# Patient Record
Sex: MALE | Race: WHITE
[De-identification: names, ages, dates, MRNs, and addresses within clinical notes are randomized per-mention and may not be internally consistent; named-entity substitution may affect disease eponyms.]

---

## 2017-03-20 ENCOUNTER — HOSPITAL ENCOUNTER (OUTPATIENT)
Dept: HOSPITAL 47 - RADMRIMAIN | Age: 52
End: 2017-03-20
Payer: COMMERCIAL

## 2017-03-20 DIAGNOSIS — J32.4: Primary | ICD-10-CM

## 2017-03-20 DIAGNOSIS — H49.11: ICD-10-CM

## 2017-03-20 PROCEDURE — 70553 MRI BRAIN STEM W/O & W/DYE: CPT

## 2017-03-20 NOTE — MR
EXAMINATION TYPE: MR brain wo/w con

 

DATE OF EXAM: 3/20/2017 7:00 AM

 

COMPARISON: NONE

 

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

 

TECHNIQUE:  Multiplanar, multisequence images of the brain and brainstem were acquired before and aft
er administration of  20 mL IV MultiHance.  Diffusion weighted imaging is performed. 

 

FINDINGS:  

No evidence for acute infarction, hemorrhage, mass, mass effect, midline shift, herniation, effacemen
t of basal cisterns, or extra-axial fluid collection. 

 

The ventricles and sulci are age-appropriate.

 

Major intracranial flow voids are intact.

 

T2/FLAIR weighted sequences show mild scattered burden of T2 bright white matter change located in th
e subcortical, periventricular, and deep white matter of both cerebral hemispheres numbering approxim
ately 10 foci on each side.

 

Midline structures demonstrate normal morphology.  The craniocervical junction is normal. 

 

Post contrast images demonstrate no evidence of pathologic enhancement.  Dural venous sinuses are pat
ent.

 

There is mild-to-moderate mucosal thickening throughout the maxillary and frontal sinuses as well as 
the ethmoid air cells.

 

 

IMPRESSION:

 

1. No acute intracranial abnormality seen. No base of skull mass or abnormal intracranial enhancing l
esions.

2. Mild scattered burden of T2 bright white matter change. This is nonspecific but most likely relate
s to chronic small vessel ischemic disease. Clinically correlate.

3. Mild-to-moderate chronic paranasal sinus disease.

## 2018-12-21 ENCOUNTER — HOSPITAL ENCOUNTER (OUTPATIENT)
Dept: HOSPITAL 47 - RADMRIMAIN | Age: 53
Discharge: HOME | End: 2018-12-21
Payer: COMMERCIAL

## 2018-12-21 DIAGNOSIS — R90.89: ICD-10-CM

## 2018-12-21 DIAGNOSIS — G31.1: Primary | ICD-10-CM

## 2018-12-21 DIAGNOSIS — H49.11: ICD-10-CM

## 2018-12-21 PROCEDURE — 70553 MRI BRAIN STEM W/O & W/DYE: CPT

## 2018-12-21 NOTE — MR
EXAMINATION TYPE: MR brain wo/w con

 

DATE OF EXAM: 12/21/2018

 

COMPARISON: MRI brain March 20, 2017

 

HISTORY: Fourth [trochlear] nerve palsy, right eye double vision

 

TECHNIQUE: 

Multiplanar, multisequence images of the brain and brainstem is performed without and with IV contras
t, utilizing 9 mL intravenous Gadavist .

 

FINDINGS: Diffusion weighted images demonstrate no evidence of a recent infarct or other diffusion ab
normality.  There is no worrisome extra-axial fluid collection. There is mild ventricular and sulcal 
prominence consistent with mild diffuse age-related cerebral atrophy. There is redemonstration of sca
ttered foci of T2 hyperintensity seen throughout the white matter bilaterally. Approximately 10-20 sc
attered small lesions are redemonstrated. No significant change from prior MRI is identified.

 

 Midline structures demonstrate normal morphology.  The craniocervical junction appears within normal
 limits.  Post contrast images demonstrate no abnormal enhancement. Mild-to-moderate mucosal thickeni
ng involving bilateral maxillary sinuses is redemonstrated. The globes are intact bilaterally.

 

IMPRESSION: 

Mild diffuse cerebral atrophy and nonspecific white matter changes most likely on basis of chronic sm
all vessel ischemic change in patient of this age. Persistent mild to moderate chronic maxillary sinu
s disease. No significant change from prior study. No suspicious enhancement is noted.

## 2022-07-26 ENCOUNTER — HOSPITAL ENCOUNTER (OUTPATIENT)
Dept: HOSPITAL 47 - LABWHC1 | Age: 57
Discharge: HOME | End: 2022-07-26
Attending: GENERAL PRACTICE
Payer: COMMERCIAL

## 2022-07-26 DIAGNOSIS — E34.9: ICD-10-CM

## 2022-07-26 DIAGNOSIS — B25.9: ICD-10-CM

## 2022-07-26 DIAGNOSIS — B00.9: Primary | ICD-10-CM

## 2022-07-26 DIAGNOSIS — B27.90: ICD-10-CM

## 2022-07-26 DIAGNOSIS — E03.9: ICD-10-CM

## 2022-07-26 LAB
EBV EA IGG SER-ACNC: 1.6 AI
EBV NA IGG SER-ACNC: 3.9 AI
EBV VCA IGG SER IA-ACNC: >8 AI
EBV VCA IGM SP2 SER QL: <0.2 AI
T4 FREE SERPL-MCNC: 1.14 NG/DL (ref 0.8–1.8)

## 2022-07-26 PROCEDURE — 86663 EPSTEIN-BARR ANTIBODY: CPT

## 2022-07-26 PROCEDURE — 84443 ASSAY THYROID STIM HORMONE: CPT

## 2022-07-26 PROCEDURE — 84481 FREE ASSAY (FT-3): CPT

## 2022-07-26 PROCEDURE — 86645 CMV ANTIBODY IGM: CPT

## 2022-07-26 PROCEDURE — 84140 ASSAY OF PREGNENOLONE: CPT

## 2022-07-26 PROCEDURE — 86644 CMV ANTIBODY: CPT

## 2022-07-26 PROCEDURE — 84402 ASSAY OF FREE TESTOSTERONE: CPT

## 2022-07-26 PROCEDURE — 82670 ASSAY OF TOTAL ESTRADIOL: CPT

## 2022-07-26 PROCEDURE — 84403 ASSAY OF TOTAL TESTOSTERONE: CPT

## 2022-07-26 PROCEDURE — 82627 DEHYDROEPIANDROSTERONE: CPT

## 2022-07-26 PROCEDURE — 86665 EPSTEIN-BARR CAPSID VCA: CPT

## 2022-07-26 PROCEDURE — 84439 ASSAY OF FREE THYROXINE: CPT

## 2022-07-26 PROCEDURE — 86664 EPSTEIN-BARR NUCLEAR ANTIGEN: CPT

## 2022-07-26 PROCEDURE — 36415 COLL VENOUS BLD VENIPUNCTURE: CPT

## 2023-04-19 ENCOUNTER — HOSPITAL ENCOUNTER (OUTPATIENT)
Dept: HOSPITAL 47 - LABWHC1 | Age: 58
Discharge: HOME | End: 2023-04-19
Attending: GENERAL PRACTICE
Payer: COMMERCIAL

## 2023-04-19 DIAGNOSIS — E06.9: ICD-10-CM

## 2023-04-19 DIAGNOSIS — D50.9: Primary | ICD-10-CM

## 2023-04-19 DIAGNOSIS — E03.9: ICD-10-CM

## 2023-04-19 LAB
BASOPHILS # BLD AUTO: 0.05 X 10*3/UL (ref 0–0.1)
BASOPHILS NFR BLD AUTO: 1.1 %
EOSINOPHIL # BLD AUTO: 0.28 X 10*3/UL (ref 0.04–0.35)
EOSINOPHIL NFR BLD AUTO: 6.4 %
ERYTHROCYTE [DISTWIDTH] IN BLOOD BY AUTOMATED COUNT: 5.1 X 10*6/UL (ref 4.4–5.6)
ERYTHROCYTE [DISTWIDTH] IN BLOOD: 12.8 % (ref 11.5–14.5)
HCT VFR BLD AUTO: 46.1 % (ref 39.6–50)
HGB BLD-MCNC: 15.6 G/DL (ref 13–17)
IMM GRANULOCYTES BLD QL AUTO: 0 %
IRON SERPL-MCNC: 83 UG/DL (ref 65–175)
LYMPHOCYTES # SPEC AUTO: 1.62 X 10*3/UL (ref 0.9–5)
LYMPHOCYTES NFR SPEC AUTO: 37.2 %
MCH RBC QN AUTO: 30.6 PG (ref 27–32)
MCHC RBC AUTO-ENTMCNC: 33.8 G/DL (ref 32–37)
MCV RBC AUTO: 90.4 FL (ref 80–97)
MONOCYTES # BLD AUTO: 0.42 X 10*3/UL (ref 0.2–1)
MONOCYTES NFR BLD AUTO: 9.7 %
NEUTROPHILS # BLD AUTO: 1.98 X 10*3/UL (ref 1.8–7.7)
NEUTROPHILS NFR BLD AUTO: 45.6 %
NRBC BLD AUTO-RTO: 0 /100 WBCS (ref 0–0)
PLATELET # BLD AUTO: 246 X 10*3/UL (ref 140–440)
T4 FREE SERPL-MCNC: 1.05 NG/DL (ref 0.8–1.8)
WBC # BLD AUTO: 4.35 X 10*3/UL (ref 4.5–10)

## 2023-04-19 PROCEDURE — 84443 ASSAY THYROID STIM HORMONE: CPT

## 2023-04-19 PROCEDURE — 86376 MICROSOMAL ANTIBODY EACH: CPT

## 2023-04-19 PROCEDURE — 82627 DEHYDROEPIANDROSTERONE: CPT

## 2023-04-19 PROCEDURE — 84481 FREE ASSAY (FT-3): CPT

## 2023-04-19 PROCEDURE — 84140 ASSAY OF PREGNENOLONE: CPT

## 2023-04-19 PROCEDURE — 36415 COLL VENOUS BLD VENIPUNCTURE: CPT

## 2023-04-19 PROCEDURE — 83540 ASSAY OF IRON: CPT

## 2023-04-19 PROCEDURE — 84439 ASSAY OF FREE THYROXINE: CPT

## 2023-04-19 PROCEDURE — 86800 THYROGLOBULIN ANTIBODY: CPT

## 2023-04-19 PROCEDURE — 85025 COMPLETE CBC W/AUTO DIFF WBC: CPT
